# Patient Record
Sex: FEMALE | Race: WHITE | NOT HISPANIC OR LATINO
[De-identification: names, ages, dates, MRNs, and addresses within clinical notes are randomized per-mention and may not be internally consistent; named-entity substitution may affect disease eponyms.]

---

## 2020-08-07 ENCOUNTER — APPOINTMENT (OUTPATIENT)
Dept: PHYSICAL MEDICINE AND REHAB | Facility: CLINIC | Age: 60
End: 2020-08-07
Payer: COMMERCIAL

## 2020-08-07 VITALS — TEMPERATURE: 96.5 F

## 2020-08-07 DIAGNOSIS — U07.1 COVID-19: ICD-10-CM

## 2020-08-07 DIAGNOSIS — Z86.39 PERSONAL HISTORY OF OTHER ENDOCRINE, NUTRITIONAL AND METABOLIC DISEASE: ICD-10-CM

## 2020-08-07 DIAGNOSIS — Z72.89 OTHER PROBLEMS RELATED TO LIFESTYLE: ICD-10-CM

## 2020-08-07 DIAGNOSIS — Z60.2 PROBLEMS RELATED TO LIVING ALONE: ICD-10-CM

## 2020-08-07 DIAGNOSIS — Z86.69 PERSONAL HISTORY OF OTHER DISEASES OF THE NERVOUS SYSTEM AND SENSE ORGANS: ICD-10-CM

## 2020-08-07 DIAGNOSIS — Z78.9 OTHER SPECIFIED HEALTH STATUS: ICD-10-CM

## 2020-08-07 DIAGNOSIS — Z87.39 PERSONAL HISTORY OF OTHER DISEASES OF THE MUSCULOSKELETAL SYSTEM AND CONNECTIVE TISSUE: ICD-10-CM

## 2020-08-07 DIAGNOSIS — Z82.61 FAMILY HISTORY OF ARTHRITIS: ICD-10-CM

## 2020-08-07 DIAGNOSIS — M79.604 PAIN IN RIGHT LEG: ICD-10-CM

## 2020-08-07 DIAGNOSIS — Z82.62 FAMILY HISTORY OF OSTEOPOROSIS: ICD-10-CM

## 2020-08-07 DIAGNOSIS — Z85.9 PERSONAL HISTORY OF MALIGNANT NEOPLASM, UNSPECIFIED: ICD-10-CM

## 2020-08-07 PROBLEM — Z00.00 ENCOUNTER FOR PREVENTIVE HEALTH EXAMINATION: Status: ACTIVE | Noted: 2020-08-07

## 2020-08-07 PROCEDURE — 73590 X-RAY EXAM OF LOWER LEG: CPT | Mod: LT

## 2020-08-07 PROCEDURE — 99204 OFFICE O/P NEW MOD 45 MIN: CPT

## 2020-08-07 PROCEDURE — 73562 X-RAY EXAM OF KNEE 3: CPT | Mod: LT

## 2020-08-07 PROCEDURE — 72100 X-RAY EXAM L-S SPINE 2/3 VWS: CPT

## 2020-08-07 RX ORDER — ACETAMINOPHEN 325 MG/1
TABLET, FILM COATED ORAL
Refills: 0 | Status: ACTIVE | COMMUNITY

## 2020-08-07 RX ORDER — ATORVASTATIN CALCIUM 80 MG/1
TABLET, FILM COATED ORAL
Refills: 0 | Status: ACTIVE | COMMUNITY

## 2020-08-07 RX ORDER — DULOXETINE HYDROCHLORIDE 30 MG/1
CAPSULE, DELAYED RELEASE ORAL
Refills: 0 | Status: ACTIVE | COMMUNITY

## 2020-08-07 SDOH — SOCIAL STABILITY - SOCIAL INSECURITY: PROBLEMS RELATED TO LIVING ALONE: Z60.2

## 2020-08-07 NOTE — REVIEW OF SYSTEMS
[Joint Pain] : joint pain [Joint Stiffness] : joint stiffness [Muscle Weakness] : muscle weakness [Negative] : Heme/Lymph [Muscle Pain] : no muscle pain

## 2020-08-07 NOTE — HISTORY OF PRESENT ILLNESS
[FreeTextEntry1] : Location: right leg and left knee\par Quality: sharp\par Severity: severe at times in knee, 6/10 in right calf\par Duration: years but worse last month\par Timing: chronic\par Context: atraumatic\par Aggravating Factors: walking\par Alleviating Factors: rest\par Associated Symptoms: denies weight loss, fever, chills, change in bowel/bladder habits, redness, warmth, weakness, +numbness/tingling, +radiation down right calf\par

## 2020-08-07 NOTE — PHYSICAL EXAM
[FreeTextEntry1] : QUINN is a 60 year female \par Constitutional: healthy appearing, NAD, and normal body habitus\par \par LUMBAR\par ROM: flexion to 30 deg, ext to 5 deg\par \par Gait: antalgic\par \par Inspection: no erythema, warmth\par Spine: no TTP in spinous process, SI joint, sacrum\par Bony palpation: no TTP in GT\par \par Soft tissue palpation hip: no TTP in gluteus carlitos, medius\par Soft tissue palpation of spine: no TTP in lumbar paraspinals\par \par 5/5 bilateral HF, KE, DF, PF \par sensation intact in bilat LE\par reflexes: knee and ankle 0 bilat\par \par Special tests: neg seated SLR\par \par LEFT KNEE\par no swelling, erythema, warmth; laterally shifted patella\par flexion to 100 deg, ext normal\par no TTP in patellar and quad tendon, medial/lateral joint\par 5/5 knee ext on left\par neg Lachman, valgus/varus laxity\par

## 2020-08-07 NOTE — ASSESSMENT
[FreeTextEntry1] : Ice area often.  Limit sitting.  No running for now but her goal is to jog in future. \par \par F/u 1 month.

## 2020-08-07 NOTE — DATA REVIEWED
[Plain X-Rays] : plain X-Rays [FreeTextEntry1] : In the office x-rays of the right tibia and fibula AP lateral show no gross abnormalities\par \par in office x-rays of the left knee AP lateral and sunrise show mild/moderate medial arthritis, severely laterally tilted patella\par \par In office x-rays of the lumbar spine AP lateral show mild scoliosis on AP, mild/moderate facet arthritis diffusely, no gross fractures

## 2020-09-09 ENCOUNTER — APPOINTMENT (OUTPATIENT)
Dept: PHYSICAL MEDICINE AND REHAB | Facility: CLINIC | Age: 60
End: 2020-09-09
Payer: COMMERCIAL

## 2020-09-09 VITALS — TEMPERATURE: 95.7 F

## 2020-09-09 PROCEDURE — 99214 OFFICE O/P EST MOD 30 MIN: CPT

## 2020-09-09 RX ORDER — CELECOXIB 200 MG/1
200 CAPSULE ORAL DAILY
Qty: 30 | Refills: 0 | Status: ACTIVE | COMMUNITY
Start: 2020-09-09 | End: 1900-01-01

## 2020-09-09 NOTE — ASSESSMENT
[FreeTextEntry1] : Switch PT to knees.  \par \par PT not helping sciatica.  Take nsaid with more food. Try [employer]izant on weekends.

## 2020-09-09 NOTE — HISTORY OF PRESENT ILLNESS
[FreeTextEntry1] : Location: right leg and left knee\par Quality: sharp\par Severity: 3/10 in back, moderate in right leg\par Duration: years but worse last month\par Timing: chronic\par Context: atraumatic\par Aggravating Factors: walking\par Alleviating Factors: rest\par Associated Symptoms: denies weight loss, fever, chills, change in bowel/bladder habits, redness, warmth, weakness, +numbness/tingling, +radiation down right calf\par xray 8/2020

## 2020-09-09 NOTE — PHYSICAL EXAM
[FreeTextEntry1] : QUINN is a 60 year female \par Constitutional: healthy appearing, NAD, and normal body habitus\par \par LUMBAR\par ROM: flexion to 30 deg, ext to 5 deg\par \par Gait: antalgic\par \par Inspection: no erythema, warmth\par Spine: no TTP in spinous process, SI joint, sacrum\par Bony palpation: no TTP in GT\par \par Soft tissue palpation hip: no TTP in gluteus carlitos, medius\par Soft tissue palpation of spine: no TTP in lumbar paraspinals\par \par 5/5 bilateral HF, KE, DF, PF \par sensation intact in bilat LE\par reflexes: knee and ankle 0 bilat\par \par Special tests: neg seated SLR\par \par LEFT KNEE\par no swelling, erythema, warmth\par flexion to 100 deg, ext normal\par 5/5 knee ext on left\par

## 2020-09-16 ENCOUNTER — APPOINTMENT (OUTPATIENT)
Dept: PHYSICAL MEDICINE AND REHAB | Facility: CLINIC | Age: 60
End: 2020-09-16
Payer: COMMERCIAL

## 2020-09-16 VITALS — TEMPERATURE: 96.4 F

## 2020-09-16 PROCEDURE — 64484 NJX AA&/STRD TFRM EPI L/S EA: CPT | Mod: RT

## 2020-09-16 PROCEDURE — 64483 NJX AA&/STRD TFRM EPI L/S 1: CPT | Mod: RT

## 2020-10-07 ENCOUNTER — APPOINTMENT (OUTPATIENT)
Dept: PHYSICAL MEDICINE AND REHAB | Facility: CLINIC | Age: 60
End: 2020-10-07
Payer: COMMERCIAL

## 2020-10-07 VITALS — TEMPERATURE: 95.3 F

## 2020-10-07 PROCEDURE — 99214 OFFICE O/P EST MOD 30 MIN: CPT

## 2020-10-07 NOTE — PHYSICAL EXAM
[FreeTextEntry1] : QUINN is a 60 year female \par Constitutional: healthy appearing, NAD, and normal body habitus\par \par LUMBAR\par ROM: flexion to 30 deg, ext to 5 deg\par \par Gait: antalgic\par \par Inspection: no erythema, warmth\par Spine: no TTP in spinous process, SI joint, sacrum\par Bony palpation: no TTP in GT\par \par Soft tissue palpation hip: no TTP in gluteus carlitos, medius\par Soft tissue palpation of spine: no TTP in lumbar paraspinals\par \par 5/5 bilateral HF, KE, DF, PF \par sensation intact in bilat LE\par reflexes: knee and ankle 0 bilat\par \par Special tests: neg seated SLR\par \par LEFT KNEE\par mild swelling, no erythema, warmth\par flexion to 100 deg, ext normal\par 5/5 knee ext on left

## 2020-10-07 NOTE — HISTORY OF PRESENT ILLNESS
[FreeTextEntry1] : Location: right leg and left knee\par Quality: sharp\par Severity: 2/10 \par Duration: years but worse last month\par Timing: chronic\par Context: atraumatic\par Aggravating Factors: walking\par Alleviating Factors: rest, PARVEEN\par Associated Symptoms: denies weight loss, fever, chills, change in bowel/bladder habits, redness, warmth, weakness, no more numbness/tingling, not much radiation down right calf\par Imaging: xray 8/2020 , MRI 2020\par 9/2020 right L5 and S1 PARVEEN - significant relief but then slipped when wearing old shoes

## 2020-10-07 NOTE — ASSESSMENT
[FreeTextEntry1] : Take [employer]izant when has leg pain.  Taught wall squats. \par \par Get new shoes. \par \par f/u 1 month

## 2020-11-16 ENCOUNTER — APPOINTMENT (OUTPATIENT)
Dept: PHYSICAL MEDICINE AND REHAB | Facility: CLINIC | Age: 60
End: 2020-11-16

## 2020-12-07 ENCOUNTER — APPOINTMENT (OUTPATIENT)
Dept: PHYSICAL MEDICINE AND REHAB | Facility: CLINIC | Age: 60
End: 2020-12-07
Payer: COMMERCIAL

## 2020-12-07 ENCOUNTER — TRANSCRIPTION ENCOUNTER (OUTPATIENT)
Age: 60
End: 2020-12-07

## 2020-12-07 VITALS — TEMPERATURE: 94.2 F

## 2020-12-07 DIAGNOSIS — S80.01XA CONTUSION OF RIGHT KNEE, INITIAL ENCOUNTER: ICD-10-CM

## 2020-12-07 DIAGNOSIS — M17.12 UNILATERAL PRIMARY OSTEOARTHRITIS, LEFT KNEE: ICD-10-CM

## 2020-12-07 PROCEDURE — 99072 ADDL SUPL MATRL&STAF TM PHE: CPT

## 2020-12-07 PROCEDURE — 99214 OFFICE O/P EST MOD 30 MIN: CPT

## 2020-12-07 NOTE — HISTORY OF PRESENT ILLNESS
[FreeTextEntry1] : Location:  back\par Quality: sharp\par Severity: 3/10, worse after falling\par Duration: years \par Timing: chronic\par Context: atraumatic\par Aggravating Factors: walking\par Alleviating Factors: rest, PARVEEN\par Associated Symptoms: denies weight loss, fever, chills, change in bowel/bladder habits, redness, warmth, weakness, no more numbness/tingling, not much radiation down right calf\par Imaging: xray 8/2020 , MRI 2020\par 9/2020 right L5 and S1 PARVEEN - significant relief but then slipped when wearing old shoes \par 12/2020 fell onto hands and knees; mild swelling in left knee which improved

## 2020-12-07 NOTE — PHYSICAL EXAM
[FreeTextEntry1] : QUINN is a 60 year female \par Constitutional: healthy appearing, NAD, and normal body habitus\par \par LUMBAR\par ROM: flexion to 30 deg, ext to 5 deg\par \par Gait: antalgic\par \par Inspection: no erythema, warmth\par Spine: no TTP in spinous process, SI joint, sacrum\par Bony palpation: no TTP in GT\par \par Soft tissue palpation hip: no TTP in gluteus carlitos, medius\par Soft tissue palpation of spine: no TTP in lumbar paraspinals\par \par 5/5 bilateral HF, KE, DF, PF \par sensation intact in bilat LE\par reflexes: knee and ankle 0 bilat\par \par Special tests: neg seated SLR\par \par KNEES\par mild swelling on left, none on right, no erythema, warmth; scab on right knee on patella\par flexion to 110 deg, ext normal\par 5/5 knee ext on left

## 2020-12-07 NOTE — ASSESSMENT
[FreeTextEntry1] : Try to get sit to stand desk.  Limit sitting.  \par \par Take [employer]izant every other day.

## 2020-12-14 ENCOUNTER — RX RENEWAL (OUTPATIENT)
Age: 60
End: 2020-12-14

## 2020-12-14 RX ORDER — GABAPENTIN ENACARBIL 300 MG/1
300 TABLET, EXTENDED RELEASE ORAL
Qty: 30 | Refills: 0 | Status: ACTIVE | COMMUNITY
Start: 2020-09-09 | End: 1900-01-01

## 2021-02-19 ENCOUNTER — NON-APPOINTMENT (OUTPATIENT)
Age: 61
End: 2021-02-19

## 2021-02-19 RX ORDER — MELOXICAM 7.5 MG/1
7.5 TABLET ORAL
Qty: 60 | Refills: 0 | Status: ACTIVE | COMMUNITY
Start: 2020-08-07 | End: 1900-01-01

## 2021-02-22 ENCOUNTER — APPOINTMENT (OUTPATIENT)
Dept: PHYSICAL MEDICINE AND REHAB | Facility: CLINIC | Age: 61
End: 2021-02-22
Payer: COMMERCIAL

## 2021-02-22 DIAGNOSIS — M16.11 UNILATERAL PRIMARY OSTEOARTHRITIS, RIGHT HIP: ICD-10-CM

## 2021-02-22 DIAGNOSIS — M51.26 OTHER INTERVERTEBRAL DISC DISPLACEMENT, LUMBAR REGION: ICD-10-CM

## 2021-02-22 DIAGNOSIS — M48.062 SPINAL STENOSIS, LUMBAR REGION WITH NEUROGENIC CLAUDICATION: ICD-10-CM

## 2021-02-22 DIAGNOSIS — M79.18 MYALGIA, OTHER SITE: ICD-10-CM

## 2021-02-22 PROCEDURE — 99072 ADDL SUPL MATRL&STAF TM PHE: CPT

## 2021-02-22 PROCEDURE — 73502 X-RAY EXAM HIP UNI 2-3 VIEWS: CPT | Mod: RT

## 2021-02-22 PROCEDURE — 99214 OFFICE O/P EST MOD 30 MIN: CPT | Mod: 25

## 2021-02-22 PROCEDURE — 20552 NJX 1/MLT TRIGGER POINT 1/2: CPT

## 2021-02-22 NOTE — ASSESSMENT
[FreeTextEntry1] : Ice area often.  Educated to lose weight.  Shovel properly in the future. Walk in good shoes. \par \par Discussed hip and back injections.  Cannot do for 2 wk since had 2nd Moderna vaccine. \par \par Take [employer]izant 1-2 hr before bed.\par \par Take Tylenol prn.

## 2021-02-22 NOTE — HISTORY OF PRESENT ILLNESS
[FreeTextEntry1] : Location: back\par Quality: sharp\par Severity: severe\par Duration: years \par Timing: chronic\par Context: atraumatic\par Aggravating Factors: walking\par Alleviating Factors: rest, PARVEEN\par Associated Symptoms: denies weight loss, fever, chills, change in bowel/bladder habits, redness, warmth, weakness, no more numbness/tingling, not much radiation down right calf\par Imaging: xray 8/2020 , MRI 2020\par 9/2020 right L5 and S1 PARVEEN - significant relief but then slipped when wearing old shoes \par 12/2020 fell onto hands and knees; mild swelling in left knee which improved \par 1/2021 hurt back shoveling snow

## 2021-02-22 NOTE — DATA REVIEWED
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

## 2021-02-22 NOTE — PHYSICAL EXAM
[FreeTextEntry1] : QUINN is a 60 year female \par Constitutional: healthy appearing, NAD, and normal body habitus\par \par LUMBAR\par ROM: flexion to 30 deg, ext to 5 deg\par \par Gait: antalgic\par \par Inspection: no erythema, warmth\par Spine: no TTP in spinous process, SI joint, sacrum\par Bony palpation: no TTP in GT\par \par Soft tissue palpation hip: no TTP in gluteus carlitos, medius\par Soft tissue palpation of spine: no TTP in lumbar paraspinals\par \par 5/5 bilateral HF, KE, DF, PF \par sensation intact in bilat LE\par reflexes: knee and ankle 0 bilat\par \par Special tests: neg seated SLR\par +FADIR in right hip

## 2021-02-22 NOTE — PROCEDURE
[de-identified] : \par Trigger Point Tx\par After cleaning with alcohol, sterile needles were inserted into Ub 25 to 26 bilaterally and right gluteus carlitos  and manipulated intermittently followed by injection of Lidocaine. The needles were then removed. Hemostasis was achieved immediately. She  tolerated the procedure well and was given discharge instructions and then discharged to home without any complaints or complications.\par \par